# Patient Record
Sex: MALE | Race: WHITE | NOT HISPANIC OR LATINO | URBAN - METROPOLITAN AREA
[De-identification: names, ages, dates, MRNs, and addresses within clinical notes are randomized per-mention and may not be internally consistent; named-entity substitution may affect disease eponyms.]

---

## 2021-05-15 ENCOUNTER — EMERGENCY (EMERGENCY)
Facility: HOSPITAL | Age: 51
LOS: 0 days | Discharge: HOME | End: 2021-05-15
Attending: EMERGENCY MEDICINE | Admitting: EMERGENCY MEDICINE
Payer: COMMERCIAL

## 2021-05-15 VITALS — WEIGHT: 184.97 LBS

## 2021-05-15 VITALS
RESPIRATION RATE: 18 BRPM | HEART RATE: 79 BPM | TEMPERATURE: 98 F | DIASTOLIC BLOOD PRESSURE: 95 MMHG | OXYGEN SATURATION: 100 % | SYSTOLIC BLOOD PRESSURE: 195 MMHG
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
## 2021-05-15 DIAGNOSIS — S80.862A INSECT BITE (NONVENOMOUS), LEFT LOWER LEG, INITIAL ENCOUNTER: ICD-10-CM
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
## 2021-05-15 PROCEDURE — 99283 EMERGENCY DEPT VISIT LOW MDM: CPT | Mod: 25

## 2021-05-15 PROCEDURE — 10120 INC&RMVL FB SUBQ TISS SMPL: CPT

## 2021-05-15 NOTE — ED ADULT NURSE NOTE - OBJECTIVE STATEMENT
pt presents with a tic impended into left calf. pt noted it one hour prior to arrival. Pt believes he must of got yesterday. pt denies pain

## 2021-05-15 NOTE — ED PROVIDER NOTE - OBJECTIVE STATEMENT
51 y/o male presents to the ED c/o "I have a tick on the back of my left leg[place] no fever/ chills

## 2021-05-15 NOTE — ED PROVIDER NOTE - CLINICAL SUMMARY MEDICAL DECISION MAKING FREE TEXT BOX
a/p; tick removed, concern for lyme. dc home doxycyline x 10 days, f/u ID 1-2 weeks, strict return precautions provided. tick given to pt, he wants to get it tested. entire tick removed including head.

## 2021-05-15 NOTE — ED PROCEDURE NOTE - CPROC ED SITE PREP1
School Nurse Clare called Regarding Pt's medication for anxiety. she was concerned because pharmacy labeled it for itching. She said pharmacy is closed today.She also said parents said its for anxiety but she wanted to talk to Dr Butts her number is [phone] and this is a secure line if you wabt to leave a voice mail.   alcohol

## 2021-05-15 NOTE — ED PROVIDER NOTE - ATTENDING CONTRIBUTION TO CARE
50M no pmh p/w tick bite to L calf. [place] today[place] no fever, chills. no numbness, weakness.    on exam, AFVSS, well james nad, ncat, eomi, perrla, mmm, aaox3, no focal deficits, no le edema or calf ttp, L calf tick bite present w bullseye rash , nontender, no discharge, no swelling, from, 5/5 motor, silt, 2+ dp pulse    a/p; tick removed, concern for lyme. dc home doxycyline x 10 days, f/u ID 1-2 weeks, strict return precautions provided. tick given to pt, he wants to get it tested. entire tick removed including head.

## 2021-05-15 NOTE — ED PROVIDER NOTE - PATIENT PORTAL LINK FT
You can access the FollowMyHealth Patient Portal offered by [facility] by registering at the following website: [email or web address]. By joining [employer]’s FollowMyHealth portal, you will also be able to view your health information using other applications (apps) compatible with our system.

## 2021-05-15 NOTE — ED PROVIDER NOTE - NSFOLLOWUPINSTRUCTIONS_ED_ALL_ED_FT
Tick Bite    WHAT YOU NEED TO KNOW:    Most tick bites are not dangerous, but ticks can pass disease or infection when they bite. Ticks need to be removed quickly. You may have redness, pain, itching, and swelling near the bite. Blisters may also develop.     DISCHARGE INSTRUCTIONS:    Return to the emergency department if:     You have trouble walking or moving your legs.      You have joint pain, muscle pain, or muscle weakness within 1 month of a tick bite.      You have a fever, chills, headache, or rash.    Contact your healthcare provider if:     You cannot remove the tick.       The tick's head is stuck in your skin.      You have questions or concerns about your condition or care.    Medicines:     Medicines help decrease pain, redness, itching, and swelling. You may also need medicine to prevent or fight a bacterial infection. These medicines may be given as a cream, lotion, or pill.      Take your medicine as directed. Contact your healthcare provider if you think your medicine is not helping or if you have side effects. Tell him of her if you are allergic to any medicine. Keep a list of the medicines, vitamins, and herbs you take. Include the amounts, and when and why you take them. Bring the list or the pill bottles to follow-up visits. Carry your medicine list with you in case of an emergency.    How to remove a tick: Remove the tick as soon as possible to help prevent disease or infection. You are less likely to get sick from a tick bite if you remove the tick within 24 hours. Do not use petroleum jelly, nail polish, rubbing alcohol, or heat. These do not work and may be dangerous. Do the following to remove a tick:     First, try a soapy cotton ball. Soak a cotton ball in liquid soap. Cover the tick with the cotton ball for 30 seconds. The tick may come off with the cotton ball when you pull it away.      Use tweezers if the soapy cotton ball does not work. Grasp the tick as close to your skin as possible. Pull the tick straight up and out. Do not touch the tick with your bare hands.      Do not twist or jerk the tick suddenly, because this may break off the tick's head or mouth parts. Do not leave any part of the tick in your skin.      Do not crush or squeeze the tick since its body may be infected with germs. Flush the tick down the toilet.      After the tick is removed, clean the area of the bite with rubbing alcohol. Then wash your hands with soap and water.    Apply ice on your bite for 15 to 20 minutes every hour or as directed. Use an ice pack, or put crushed ice in a plastic bag. Cover it with a towel before you apply it to your skin. Ice helps prevent tissue damage and decreases swelling and pain.    Prevent a tick bite: Ticks live in areas covered by brush and grass. They may even be found in your lawn if you live in certain areas. Outdoor pets can carry ticks inside the house. Ticks can grab onto you or your clothes when you walk by grass or brush. If you go into areas that contain many trees, tall grasses, and underbrush, do the following:    Wear light colored pants and a long-sleeved shirt. Tuck your pants into your socks or boots. Tuck in your shirt. Wear sleeves that fit close to the skin at your wrists and neck. This will help prevent ticks from crawling through gaps in your clothing and onto your skin. Wear a hat in areas with trees.      Apply insect repellant on your skin. The insect repellant should contain DEET. Do not put insect repellant on skin that is cut, scratched, or irritated. Always use soap and water to wash the insect repellant off as soon as possible once you are indoors. Do not apply insect repellant on your child's face or hands.      Spray insect repellant onto your clothes. Use permethrin spray. This spray kills ticks that crawl on your clothing. Be sure to spray the tops of your boots, bottom of pant legs, and sleeve cuffs. As soon as possible, wash and dry clothing in hot water and high heat.      Check your clothing, hair, and skin for ticks. Shower within 2 hours of coming indoors. Carefully check the hairline, armpits, neck, and waist. Check your pets and children for ticks. Remove ticks from pets the same way as you remove them from people.      Decrease the risk for ticks in your yard. Ticks like to live in shady, moist areas. Mow your lawn regularly to keep the grass short. Trim the grass around birdbaths and fences. Cut branches that are overgrown and take them out of the yard. Clear out leaf piles. Stack firewood in a dry, ulises area.    Follow up with your healthcare provider as directed: Write down your questions so you remember to ask them during your visits.

## 2022-08-15 NOTE — ED ADULT NURSE NOTE - WEIGHT IN LBS
Therapist was not able to leave message. Therapist sent out reach out letter asking patient to contact office if they remain interested in therapy services at our office.   
[phone]

## 2023-04-21 ENCOUNTER — EMERGENCY (EMERGENCY)
Facility: HOSPITAL | Age: 53
LOS: 1 days | Discharge: HOME | End: 2023-04-21
Attending: STUDENT IN AN ORGANIZED HEALTH CARE EDUCATION/TRAINING PROGRAM | Admitting: STUDENT IN AN ORGANIZED HEALTH CARE EDUCATION/TRAINING PROGRAM
Payer: COMMERCIAL

## 2023-04-21 VITALS
DIASTOLIC BLOOD PRESSURE: 96 MMHG | TEMPERATURE: 97 F | HEART RATE: 85 BPM | RESPIRATION RATE: 17 BRPM | HEIGHT: 68 IN | SYSTOLIC BLOOD PRESSURE: 151 MMHG | OXYGEN SATURATION: 98 % | WEIGHT: 173.06 LBS

## 2023-04-21 DIAGNOSIS — H10.9 UNSPECIFIED CONJUNCTIVITIS: ICD-10-CM

## 2023-04-21 DIAGNOSIS — H57.89 OTHER SPECIFIED DISORDERS OF EYE AND ADNEXA: ICD-10-CM

## 2023-04-21 PROCEDURE — 99284 EMERGENCY DEPT VISIT MOD MDM: CPT

## 2023-04-21 NOTE — ED ADULT TRIAGE NOTE - CHIEF COMPLAINT QUOTE
Pt presents with c/o "eye redness, purulent discharge and lid swelling" since AM. Denies any fevers or other complaints.

## 2023-04-22 PROBLEM — Z78.9 OTHER SPECIFIED HEALTH STATUS: Chronic | Status: ACTIVE | Noted: 2021-05-15

## 2023-04-22 PROCEDURE — 99283 EMERGENCY DEPT VISIT LOW MDM: CPT

## 2023-04-22 RX ORDER — POLYMYXIN B SULF/TRIMETHOPRIM 10000-1/ML
1 DROPS OPHTHALMIC (EYE) ONCE
Refills: 0 | Status: COMPLETED | OUTPATIENT
Start: 2023-04-22 | End: 2023-04-22

## 2023-04-22 RX ADMIN — Medication 1 DROP(S): at 01:18

## 2023-04-22 NOTE — ED PROVIDER NOTE - CLINICAL SUMMARY MEDICAL DECISION MAKING FREE TEXT BOX
B/l bacterial conjunctivitis, Will discharge with topical ophthalmic abx and advised f/u with ophthalmologist. B/l bacterial conjunctivitis, Will discharge with polytrim

## 2023-04-22 NOTE — ED PROVIDER NOTE - PATIENT PORTAL LINK FT
You can access the FollowMyHealth Patient Portal offered by [facility] by registering at the following website: [email or web address]. By joining [employer]’s FollowMyHealth portal, you will also be able to view your health information using other applications (apps) compatible with our system.

## 2023-04-22 NOTE — ED ADULT NURSE NOTE - OBJECTIVE STATEMENT
Received a 52 year old male with a chief complaint of eye discharge and redness. Patient denies fever and alterations in visual acuity. No trauma. No chemical exposure.

## 2023-04-22 NOTE — ED PROVIDER NOTE - OBJECTIVE STATEMENT
51 y/o M with no PMHx presents to ED c/o copious purulent drainage from b/l eyes, more so from right eye but some from left eye, associated with eye redness and itching. Pt states his family had pink eye last week. He was fine until this afternoon when symptoms started developing in right eye and then spread. Denies foreign bodies, eye trauma, pain with EOM, or any exposure to chemicals. Pt does not wear contact lenses, only glasses. 53 y/o M with no PMHx presents to ED c/o copious purulent drainage from b/l eyes, more so from right eye but some from left eye, associated with eye redness and itching. Pt states his family had pink eye last week. He was fine until this afternoon when symptoms started developing in right eye and then spread to left eye. Denies foreign bodies, eye trauma, pain with EOM, or any exposure to chemicals. Pt does not wear contact lenses, only glasses.

## 2023-04-22 NOTE — ED PROVIDER NOTE - NSFOLLOWUPINSTRUCTIONS_ED_ALL_ED_FT
2 DROPS IN EACH EYE EVERY 6 HOURS FOR 7 DAYS. CONJUNCTIVITIS IS VERY CONTAGIOUS, MAKE SURE TO WASH YOUR HANDS. USE WARM COMPRESSES.    Bacterial Conjunctivitis, Adult    Bacterial conjunctivitis is an infection of the clear membrane that covers the white part of the eye and the inner surface of the eyelid (conjunctiva). When the blood vessels in the conjunctiva become inflamed, the eye becomes red or pink. The eye often feels irritated or itchy. Bacterial conjunctivitis spreads easily from person to person (is contagious). It also spreads easily from one eye to the other eye.    What are the causes?  This condition is caused by bacteria. You may get the infection if you come into close contact with:  A person who is infected with the bacteria.  Items that are contaminated with the bacteria, such as a face towel, contact lens solution, or eye makeup.  What increases the risk?  You are more likely to develop this condition if:  You are exposed to other people who have the infection.  You wear contact lenses.  You have a sinus infection.  You have had a recent eye injury or surgery.  You have a weak body defense system (immune system).  You have a medical condition that causes dry eyes.  What are the signs or symptoms?  A normal eye compared to an eye with bacterial conjunctivitis.   Symptoms of this condition include:  Thick, yellowish discharge from the eye. This may turn into a crust on the eyelid overnight and cause your eyelids to stick together.  Tearing or watery eyes.  Itchy eyes.  Burning feeling in your eyes.  Eye redness.  Swollen eyelids.  Blurred vision.  How is this diagnosed?  This condition is diagnosed based on your symptoms and medical history. Your health care provider may also take a sample of discharge from your eye to find the cause of your infection.    How is this treated?  A person putting eye drops in an eye.  This condition may be treated with:  Antibiotic eye drops or ointment to clear the infection more quickly and prevent the spread of infection to others.  Antibiotic medicines taken by mouth (orally) to treat infections that do not respond to drops or ointments or that last longer than 10 days.  Cool, wet cloths (cool compresses) placed on the eyes.  Artificial tears applied 2–6 times a day.  Follow these instructions at home:  Medicines    Take or apply your antibiotic medicine as told by your health care provider. Do not stop using the antibiotic, even if your condition improves, unless directed by your health care provider.  Take or apply over-the-counter and prescription medicines only as told by your health care provider.  Be very careful to avoid touching the edge of your eyelid with the eye-drop bottle or the ointment tube when you apply medicines to the affected eye. This will keep you from spreading the infection to your other eye or to other people.  Managing discomfort    Gently wipe away any drainage from your eye with a warm, wet washcloth or a cotton ball.  Apply a clean, cool compress to your eye for 10–20 minutes, 3–4 times a day.  General instructions    Do not wear contact lenses until the inflammation is gone and your health care provider says it is safe to wear them again. Ask your health care provider how to sterilize or replace your contact lenses before you use them again. Wear glasses until you can resume wearing contact lenses.  Avoid wearing eye makeup until the inflammation is gone. Throw away any old eye cosmetics that may be contaminated.  Change or wash your pillowcase every day.  Do not share towels or washcloths. This may spread the infection.  Wash your hands often with soap and water for at least 20 seconds and especially before touching your face or eyes. Use paper towels to dry your hands.  Avoid touching or rubbing your eyes.  Do not drive or use heavy machinery if your vision is blurred.  Contact a health care provider if:  You have a fever.  Your symptoms do not get better after 10 days.  Get help right away if:  You have a fever and your symptoms suddenly get worse.  You have severe pain when you move your eye.  You have facial pain, redness, or swelling.  You have a sudden loss of vision.  Summary  Bacterial conjunctivitis is an infection of the clear membrane that covers the white part of the eye and the inner surface of the eyelid (conjunctiva).  Bacterial conjunctivitis spreads easily from eye to eye and from person to person (is contagious).  Wash your hands often with soap and water for at least 20 seconds and especially before touching your face or eyes. Use paper towels to dry your hands.  Take or apply your antibiotic medicine as told by your health care provider. Do not stop using the antibiotic even if your condition improves.  Contact a health care provider if you have a fever or if your symptoms do not get better after 10 days. Get help right away if you have a sudden loss of vision.  This information is not intended to replace advice given to you by your health care provider. Make sure you discuss any questions you have with your health care provider.    Document Revised: 03/30/2022 Document Reviewed: 03/30/2022  Elsevier Patient Education © 2023 [employer]

## 2023-04-22 NOTE — ED PROVIDER NOTE - ATTESTATION, MLM
I have reviewed and confirmed nurses' notes for patient's medications, allergies, medical history, and surgical history. 06-Jun-2022 15:18:00

## 2023-04-22 NOTE — ED PROVIDER NOTE - PHYSICAL EXAMINATION
CONST: nontoxic NAD speaking in full sentences  HEAD: atraumatic  EYES: right eye with mild swelling to upper eyelid, +conjunctival injection, copious purulent drainage, pupil reactive, EOMI and pain free. left eye with normal eyelid, globe with less conjunctival injection when compared to right eye, small amount of purulent drainage, pupil reactive, EOMI and pain free.   ENT: mmm  NECK: supple/FROM  CARD: rrr  CHEST: no stridor/retractions/tripoding  ABD: soft, nd  EXT: FROM, symmetric   SKIN: warm, dry, no rash  NEURO: a+ox3, baseline gait

## 2025-03-27 NOTE — ED PROVIDER NOTE - NS ED MD DISPO DISCHARGE CCDA
Keep wound clean and dry for 48 hours.  After that you may shower as usual, but no soaking in water.  Wash gently with soap and water, pat dry.  Put a nonadherent dressing on the wound when you are going out or when you are going to bed.  Leave it open to the air for a few hours a day.  Do not let any animals lick the wounds.    Elevate to help reduce any swelling.    Take Tylenol as needed for pain.      Follow-up with your doctor in 2 to 3 days for recheck.    Watch for signs of infection such as redness, red streaks, pus drainage, fevers.  If you experience any of the signs or symptoms, return to the ER immediately for a recheck.    Suture removal in 10-14 days.     Patient/Caregiver provided printed discharge information.